# Patient Record
(demographics unavailable — no encounter records)

---

## 2024-10-23 NOTE — HEALTH RISK ASSESSMENT
[0] : 2) Feeling down, depressed, or hopeless: Not at all (0) [PHQ-2 Negative - No further assessment needed] : PHQ-2 Negative - No further assessment needed [Never] : Never [[id number]] : 0

## 2024-10-23 NOTE — HEALTH RISK ASSESSMENT
[0] : 2) Feeling down, depressed, or hopeless: Not at all (0) [PHQ-2 Negative - No further assessment needed] : PHQ-2 Negative - No further assessment needed [Never] : Never [[id number]] : 0

## 2024-10-23 NOTE — HEALTH RISK ASSESSMENT
[0] : 2) Feeling down, depressed, or hopeless: Not at all (0) [PHQ-2 Negative - No further assessment needed] : PHQ-2 Negative - No further assessment needed [Never] : Never [[id number]] : 0

## 2024-10-23 NOTE — HISTORY OF PRESENT ILLNESS
[FreeTextEntry1] : Follow-up of chronic medical conditions, and prescription refills. [de-identified] : Patient presents for follow-up of ADHD. Presently taking Adderall 10 mg twice daily with excellent control of symptoms. Tolerating medication well. Otherwise, feels well in general. No other new symptoms. Wishes to continue on present treatment regimen. Refuses influenza immunization.

## 2024-10-23 NOTE — HISTORY OF PRESENT ILLNESS
[FreeTextEntry1] : Follow-up of chronic medical conditions, and prescription refills. [de-identified] : Patient presents for follow-up of ADHD. Presently taking Adderall 10 mg twice daily with excellent control of symptoms. Tolerating medication well. Otherwise, feels well in general. No other new symptoms. Wishes to continue on present treatment regimen. Refuses influenza immunization.

## 2024-10-23 NOTE — HISTORY OF PRESENT ILLNESS
[FreeTextEntry1] : Follow-up of chronic medical conditions, and prescription refills. [de-identified] : Patient presents for follow-up of ADHD. Presently taking Adderall 10 mg twice daily with excellent control of symptoms. Tolerating medication well. Otherwise, feels well in general. No other new symptoms. Wishes to continue on present treatment regimen. Refuses influenza immunization.

## 2024-10-23 NOTE — PHYSICAL EXAM
[No Acute Distress] : no acute distress [Well Nourished] : well nourished [Well Developed] : well developed [Well-Appearing] : well-appearing [Normal Voice/Communication] : normal voice/communication [Normal Sclera/Conjunctiva] : normal sclera/conjunctiva [PERRL] : pupils equal round and reactive to light [EOMI] : extraocular movements intact [No JVD] : no jugular venous distention [Supple] : supple [No Respiratory Distress] : no respiratory distress  [Clear to Auscultation] : lungs were clear to auscultation bilaterally [Normal Rate] : normal rate  [Regular Rhythm] : with a regular rhythm [Normal S1, S2] : normal S1 and S2 [No Murmur] : no murmur heard [No Carotid Bruits] : no carotid bruits [Pedal Pulses Present] : the pedal pulses are present [No Edema] : there was no peripheral edema [Soft] : abdomen soft [Non Tender] : non-tender [No HSM] : no HSM [Normal Bowel Sounds] : normal bowel sounds [Normal Axillary Nodes] : no axillary lymphadenopathy [Normal Posterior Cervical Nodes] : no posterior cervical lymphadenopathy [Normal Anterior Cervical Nodes] : no anterior cervical lymphadenopathy [Normal Inguinal Nodes] : no inguinal lymphadenopathy [No Rash] : no rash [Coordination Grossly Intact] : coordination grossly intact [No Focal Deficits] : no focal deficits [Normal Gait] : normal gait [Normal Affect] : the affect was normal [Alert and Oriented x3] : oriented to person, place, and time [Normal Insight/Judgement] : insight and judgment were intact

## 2024-10-23 NOTE — REVIEW OF SYSTEMS
[Negative] : Heme/Lymph [Fatigue] : no fatigue [Recent Change In Weight] : ~T no recent weight change [Chest Pain] : no chest pain [Palpitations] : no palpitations [Skin Rash] : no skin rash [Headache] : no headache [Confusion] : no confusion [Memory Loss] : no memory loss [Insomnia] : no insomnia

## 2025-03-22 NOTE — HEALTH RISK ASSESSMENT
[0] : 2) Feeling down, depressed, or hopeless: Not at all (0) [PHQ-2 Negative - No further assessment needed] : PHQ-2 Negative - No further assessment needed [Never] : Never [[id number]] : 0

## 2025-03-22 NOTE — HISTORY OF PRESENT ILLNESS
[FreeTextEntry1] : Follow-up of chronic medical conditions, and prescription refills. [de-identified] : Patient presents for follow-up of ADHD. Presently taking Adderall 10 mg twice daily with excellent control of symptoms. Tolerating medication well. Otherwise, feels well in general. No other new symptoms. Wishes to continue on present treatment regimen.

## 2025-03-22 NOTE — HEALTH RISK ASSESSMENT
[0] : 2) Feeling down, depressed, or hopeless: Not at all (0) [PHQ-2 Negative - No further assessment needed] : PHQ-2 Negative - No further assessment needed [Never] : Never [[id number]] : 0

## 2025-03-22 NOTE — HISTORY OF PRESENT ILLNESS
[FreeTextEntry1] : Follow-up of chronic medical conditions, and prescription refills. [de-identified] : Patient presents for follow-up of ADHD. Presently taking Adderall 10 mg twice daily with excellent control of symptoms. Tolerating medication well. Otherwise, feels well in general. No other new symptoms. Wishes to continue on present treatment regimen.

## 2025-03-22 NOTE — HEALTH RISK ASSESSMENT
[0] : 2) Feeling down, depressed, or hopeless: Not at all (0) [PHQ-2 Negative - No further assessment needed] : PHQ-2 Negative - No further assessment needed [Never] : Never [[id number]] : 0

## 2025-03-22 NOTE — HISTORY OF PRESENT ILLNESS
[FreeTextEntry1] : Follow-up of chronic medical conditions, and prescription refills. [de-identified] : Patient presents for follow-up of ADHD. Presently taking Adderall 10 mg twice daily with excellent control of symptoms. Tolerating medication well. Otherwise, feels well in general. No other new symptoms. Wishes to continue on present treatment regimen.